# Patient Record
Sex: MALE | Race: WHITE | URBAN - METROPOLITAN AREA
[De-identification: names, ages, dates, MRNs, and addresses within clinical notes are randomized per-mention and may not be internally consistent; named-entity substitution may affect disease eponyms.]

---

## 2017-01-13 ENCOUNTER — OFFICE VISIT (OUTPATIENT)
Dept: FAMILY MEDICINE | Facility: CLINIC | Age: 27
End: 2017-01-13

## 2017-01-13 VITALS
SYSTOLIC BLOOD PRESSURE: 115 MMHG | HEIGHT: 71 IN | TEMPERATURE: 97.8 F | WEIGHT: 148 LBS | HEART RATE: 77 BPM | BODY MASS INDEX: 20.72 KG/M2 | DIASTOLIC BLOOD PRESSURE: 70 MMHG | OXYGEN SATURATION: 98 %

## 2017-01-13 DIAGNOSIS — Z23 NEED FOR TETANUS BOOSTER: Primary | ICD-10-CM

## 2017-01-13 DIAGNOSIS — Z23 NEEDS FLU SHOT: ICD-10-CM

## 2017-01-13 DIAGNOSIS — Z23 NEED FOR PROPHYLACTIC VACCINATION AND INOCULATION AGAINST INFLUENZA: ICD-10-CM

## 2017-01-13 DIAGNOSIS — Z13.220 SCREENING FOR HYPERLIPIDEMIA: ICD-10-CM

## 2017-01-13 DIAGNOSIS — Z00.00 ROUTINE GENERAL MEDICAL EXAMINATION AT A HEALTH CARE FACILITY: ICD-10-CM

## 2017-01-13 PROCEDURE — 99385 PREV VISIT NEW AGE 18-39: CPT | Mod: 25 | Performed by: FAMILY MEDICINE

## 2017-01-13 PROCEDURE — 36415 COLL VENOUS BLD VENIPUNCTURE: CPT | Performed by: FAMILY MEDICINE

## 2017-01-13 PROCEDURE — 80061 LIPID PANEL: CPT | Mod: 90 | Performed by: FAMILY MEDICINE

## 2017-01-13 PROCEDURE — 90715 TDAP VACCINE 7 YRS/> IM: CPT | Performed by: FAMILY MEDICINE

## 2017-01-13 PROCEDURE — 90686 IIV4 VACC NO PRSV 0.5 ML IM: CPT | Performed by: FAMILY MEDICINE

## 2017-01-13 NOTE — PROGRESS NOTES
Injectable Influenza Immunization Documentation    1.  Is the person to be vaccinated sick today?  No    2. Does the person to be vaccinated have an allergy to eggs or to a component of the vaccine?  No    3. Has the person to be vaccinated today ever had a serious reaction to influenza vaccine in the past?  No    4. Has the person to be vaccinated ever had Guillain-Venetia syndrome?  No     Form completed by Kenny Charles MA

## 2017-01-13 NOTE — Clinical Note
[facility] [address] [place]  [address]  Phone: [phone]    January 17, 2017      David Barron  [address]  [facility] [address]              Dear David,    These results are fine. I would repeat this at age 32! Let me know if you have questions.        Sincerely,     Michael Villa M.D.    Results for orders placed or performed in visit on 01/13/17   Lipid Panel ([facility])   Result Value Ref Range    Cholesterol 141 100 - 199 mg/dL    Triglycerides 47 0 - 149 mg/dL    HDL Cholesterol 65 >39 mg/dL    VLDL Cholesterol Edgardo 9 5 - 40 mg/dL    LDL Cholesterol Calculated 67 0 - 99 mg/dL    LDL/HDL Ratio 1.0 0.0 - 3.6 ratio units    Narrative    Performed at:  01 - [facility] [address], [place], [place]  [id number]  : Noble Blum MD, Phone:  [phone]

## 2017-01-13 NOTE — PROGRESS NOTES
SUBJECTIVE:     CC: David Barron is an 26 year old male who presents for preventative health visit.     Healthy Habits:    Do you get at least three servings of calcium containing foods daily (dairy, green leafy vegetables, etc.)? yes    Amount of exercise or daily activities, outside of work: 2 day(s) per week    Problems taking medications regularly not applicable    Medication side effects: No    Have you had an eye exam in the past two years? no    Do you see a dentist twice per year? No once a year     Do you have sleep apnea, excessive snoring or daytime drowsiness?no        CONCERNS  Get all shots, having a baby on the way.     Today's PHQ-2 Score: 0   PHQ-2 ( 1999 Pfizer) 1/13/2017 1/7/2016   Q1: Little interest or pleasure in doing things 0 0   Q2: Feeling down, depressed or hopeless 0 0   PHQ-2 Score 0 0       Abuse: Current or Past(Physical, Sexual or Emotional)- No  Do you feel safe in your environment - Yes    Social History   Substance Use Topics     Smoking status: Never Smoker      Smokeless tobacco: Not on file     Alcohol Use: 0.0 oz/week     0 Standard drinks or equivalent per week      Comment: occasional     The patient does not drink >3 drinks per day nor >7 drinks per week.      Recent Labs   Lab Test  01/07/16   1524   LDL  84         ROS:  C: NEGATIVE for fever, chills, change in weight  I: NEGATIVE for worrisome rashes, moles or lesions  E: NEGATIVE for vision changes or irritation  ENT: NEGATIVE for ear, mouth and throat problems  R: NEGATIVE for significant cough or SOB  CV: NEGATIVE for chest pain, palpitations or peripheral edema  GI: NEGATIVE for nausea, abdominal pain, heartburn, or change in bowel habits   male: negative for dysuria, hematuria, decreased urinary stream, erectile dysfunction, urethral discharge  M: NEGATIVE for significant arthralgias or myalgia  N: NEGATIVE for weakness, dizziness or paresthesias  P: NEGATIVE for changes in mood or affect    There is no problem  "list on file for this patient.    Past Surgical History   Procedure Laterality Date     No history of surgery         Social History   Substance Use Topics     Smoking status: Never Smoker      Smokeless tobacco: Not on file     Alcohol Use: 0.0 oz/week     0 Standard drinks or equivalent per week      Comment: occasional     Family History   Problem Relation Age of Onset     DIABETES Maternal Grandmother          No current outpatient prescriptions on file.     OBJECTIVE:     /70 mmHg  Pulse 77  Temp(Src) 97.8  F (36.6  C)  Ht 1.791 m (5' 10.5\")  Wt 67.132 kg (148 lb)  BMI 20.93 kg/m2  SpO2 98%  EXAM:  GENERAL: healthy, alert and no distress  EYES: Eyes grossly normal to inspection, PERRL and conjunctivae and sclerae normal  HENT: ear canals and TM's normal, nose and mouth without ulcers or lesions  NECK: no adenopathy, no asymmetry, masses, or scars and thyroid normal to palpation  RESP: lungs clear to auscultation - no rales, rhonchi or wheezes  CV: regular rate and rhythm, normal S1 S2, no S3 or S4, no murmur, click or rub, no peripheral edema and peripheral pulses strong  ABDOMEN: soft, nontender, no hepatosplenomegaly, no masses and bowel sounds normal  MS: no gross musculoskeletal defects noted, no edema  SKIN: no suspicious lesions or rashes  NEURO: Normal strength and tone, mentation intact and speech normal  PSYCH: mentation appears normal, affect normal/bright    ASSESSMENT/PLAN:         ICD-10-CM    1. Need for tetanus booster Z23 TDAP VACCINE (BOOSTRIX AGES 10-64)   2. Needs flu shot Z23 HC FLU VAC PRESRV FREE QUAD SPLIT VIR 3+YRS IM   3. Routine general medical examination at a health care facility Z00.00        COUNSELING:  Reviewed preventive health counseling, as reflected in patient instructions       Regular exercise       Healthy diet/nutrition         reports that he has never smoked. He does not have any smokeless tobacco history on file.    Estimated body mass index is 20.93 " "kg/(m^2) as calculated from the following:    Height as of this encounter: 1.791 m (5' 10.5\").    Weight as of this encounter: 67.132 kg (148 lb).       Counseling Resources:  ATP IV Guidelines  Pooled Cohorts Equation Calculator  FRAX Risk Assessment  ICSI Preventive Guidelines  Dietary Guidelines for Americans, 2010  USDA's MyPlate  ASA Prophylaxis  Lung CA Screening    Michael Villa MD  [facility]  "

## 2017-01-14 LAB
CHOLEST SERPL-MCNC: 141 MG/DL (ref 100–199)
HDLC SERPL-MCNC: 65 MG/DL
LDL/HDL RATIO: 1 RATIO UNITS (ref 0–3.6)
LDLC SERPL CALC-MCNC: 67 MG/DL (ref 0–99)
TRIGL SERPL-MCNC: 47 MG/DL (ref 0–149)
VLDLC SERPL CALC-MCNC: 9 MG/DL (ref 5–40)

## 2017-01-16 NOTE — PROGRESS NOTES
Quick Note:    These results are fine. I would repeat this at age 32! Let me know if you have questions.  Dr. Villa    ______

## 2019-02-19 ENCOUNTER — OFFICE VISIT (OUTPATIENT)
Dept: FAMILY MEDICINE | Facility: CLINIC | Age: 29
End: 2019-02-19

## 2019-02-19 VITALS
BODY MASS INDEX: 21.78 KG/M2 | RESPIRATION RATE: 16 BRPM | SYSTOLIC BLOOD PRESSURE: 114 MMHG | WEIGHT: 154 LBS | DIASTOLIC BLOOD PRESSURE: 58 MMHG | OXYGEN SATURATION: 97 % | HEART RATE: 74 BPM

## 2019-02-19 DIAGNOSIS — R40.4 SPELL OF ALTERED CONSCIOUSNESS: Primary | ICD-10-CM

## 2019-02-19 PROCEDURE — 99214 OFFICE O/P EST MOD 30 MIN: CPT | Performed by: FAMILY MEDICINE

## 2019-02-19 SDOH — HEALTH STABILITY: MENTAL HEALTH: HOW OFTEN DO YOU HAVE A DRINK CONTAINING ALCOHOL?: MONTHLY OR LESS

## 2019-02-19 NOTE — PROGRESS NOTES
David is here today to have further evaluation for a recent spell.  Roughly 5 days ago, while he was at home, he was voiding while sitting down and playing a game on his phone.  He stood up felt dizzy, and fell backwards hitting his head on the toilet paper roll, all of his muscles go stiff, and thinks that he may have jerked 4-5 times.  He immediately stood up, having no nausea, incontinence, or major laceration of the tongue or scalp, and felt tired, and felt some anxiety.  He has never had this before, denies using energy drinks, or stimulants.  He uses no other drugs. He does not think he was overly sleep deprived at the time.  His past medical history is significant for a benign lesion removal from his anterior scalp as a child.  His family history is significant for + fhx of seizures in a sister.  These were self-limited and she is no longer being treated for this.    Since that time he has felt well, has no headaches, amnesia, nausea, difficulty concentrating, or other involuntary activity.  Denies paresthesias.  No recent heavy alcohol use.    He works at a [employer] in [place].    He had lab evaluation in the last couple years including negative sugar, cholesterol, and electrolytes.    He denies excessive thirst, polyuria, polyphagia, weight loss.    ROS otherwise negative including sleep, neuro, CV, skin or GI     /58   Pulse 74   Resp 16   Wt 69.9 kg (154 lb)   SpO2 97%   BMI 21.78 kg/m     GENERAL: healthy, alert and no distress  EYES: Eyes grossly normal to inspection, PERRL and conjunctivae and sclerae normal  HENT: ear canals and TM's normal, nose and mouth without ulcers or lesions  NECK: no adenopathy, no asymmetry, masses, or scars and thyroid normal to palpation  RESP: lungs clear to auscultation - no rales, rhonchi or wheezes  CV: regular rate and rhythm, normal S1 S2, no S3 or S4, no murmur, click or rub, no peripheral edema and peripheral pulses strong  MS: no gross  musculoskeletal defects noted, no edema  SKIN: no suspicious lesions or rashes  NEURO: Normal strength and tone, mentation intact and speech normal  PSYCH: mentation appears normal, affect normal/bright    ASSESSMENT:  1. Spell of altered consciousness  I doubt that this truly represents a seizure, but will further evaluate with an EEG, as well as MRI.  The patient was advised not to drive if feeling tired, or altered in any way.  He is suggested not to use any energy drinks, cough and cold medicines, or any other illicit drugs.  Do not drink alcohol.  - Referral to [facility]  - MR Brain w/o Contrast; Future

## 2019-04-09 ENCOUNTER — HOSPITAL ENCOUNTER (OUTPATIENT)
Dept: MRI IMAGING | Facility: CLINIC | Age: 29
Discharge: HOME | End: 2019-04-09
Attending: FAMILY MEDICINE | Admitting: FAMILY MEDICINE
Payer: COMMERCIAL

## 2019-04-09 DIAGNOSIS — R40.4 SPELL OF ALTERED CONSCIOUSNESS: ICD-10-CM

## 2019-04-09 PROCEDURE — 70551 MRI BRAIN STEM W/O DYE: CPT

## 2019-04-24 ENCOUNTER — TRANSFERRED RECORDS (OUTPATIENT)
Dept: FAMILY MEDICINE | Facility: CLINIC | Age: 29
End: 2019-04-24